# Patient Record
Sex: MALE | Race: BLACK OR AFRICAN AMERICAN | Employment: FULL TIME | URBAN - METROPOLITAN AREA
[De-identification: names, ages, dates, MRNs, and addresses within clinical notes are randomized per-mention and may not be internally consistent; named-entity substitution may affect disease eponyms.]

---

## 2024-03-12 ENCOUNTER — HOSPITAL ENCOUNTER (OUTPATIENT)
Age: 59
Discharge: HOME | End: 2024-03-12
Payer: COMMERCIAL

## 2024-03-12 LAB
CREAT UR-MCNC: 214.9 MG/DL (ref 39–259)
MICROALBUMIN 24H UR-MCNC: 9.6 MG/DL
MICROALBUMIN/CREAT UR-RTO: 44.7 MG/G CREAT (ref 0–30)

## 2024-03-12 PROCEDURE — 85025 COMPLETE CBC W/AUTO DIFF WBC: CPT

## 2024-03-12 PROCEDURE — 80061 LIPID PANEL: CPT

## 2024-03-12 PROCEDURE — 83721 ASSAY OF BLOOD LIPOPROTEIN: CPT

## 2024-03-12 PROCEDURE — 84439 ASSAY OF FREE THYROXINE: CPT

## 2024-03-12 PROCEDURE — 80053 COMPREHEN METABOLIC PANEL: CPT

## 2024-03-12 PROCEDURE — 82570 ASSAY OF URINE CREATININE: CPT

## 2024-03-12 PROCEDURE — 82043 UR ALBUMIN QUANTITATIVE: CPT

## 2024-03-12 PROCEDURE — 82306 VITAMIN D 25 HYDROXY: CPT

## 2024-03-12 PROCEDURE — 83036 HEMOGLOBIN GLYCOSYLATED A1C: CPT

## 2024-03-12 PROCEDURE — 84443 ASSAY THYROID STIM HORMONE: CPT

## 2024-03-15 ENCOUNTER — HOSPITAL ENCOUNTER (OUTPATIENT)
Age: 59
Discharge: HOME | End: 2024-03-15
Payer: COMMERCIAL

## 2024-03-15 LAB
25(OH)D3 SERPL-MCNC: 48.03 NG/ML
ALBUMIN SERPL-MCNC: 4.6 GM/DL (ref 3.4–5)
ALP BLD-CCNC: 91 IU/L (ref 40–128)
ALT SERPL-CCNC: 15 U/L (ref 10–40)
ANION GAP SERPL CALCULATED.3IONS-SCNC: 12 MMOL/L (ref 7–16)
AST SERPL-CCNC: 15 IU/L (ref 15–37)
BASOPHILS ABSOLUTE: 0.1 K/CU MM
BASOPHILS RELATIVE PERCENT: 1.1 % (ref 0–1)
BILIRUB SERPL-MCNC: 0.4 MG/DL (ref 0–1)
BUN SERPL-MCNC: 20 MG/DL (ref 6–23)
CALCIUM SERPL-MCNC: 9.7 MG/DL (ref 8.3–10.6)
CHLORIDE BLD-SCNC: 104 MMOL/L (ref 99–110)
CHOLESTEROL, FASTING: 138 MG/DL
CO2: 22 MMOL/L (ref 21–32)
CREAT SERPL-MCNC: 0.9 MG/DL (ref 0.9–1.3)
DIFFERENTIAL TYPE: ABNORMAL
EOSINOPHILS ABSOLUTE: 0.2 K/CU MM
EOSINOPHILS RELATIVE PERCENT: 3.4 % (ref 0–3)
ESTIMATED AVERAGE GLUCOSE: 154 MG/DL
GFR SERPL CREATININE-BSD FRML MDRD: >60 ML/MIN/1.73M2
GLUCOSE SERPL-MCNC: 164 MG/DL (ref 70–99)
HBA1C MFR BLD: 7 % (ref 4.2–6.3)
HCT VFR BLD CALC: 47.8 % (ref 42–52)
HDLC SERPL-MCNC: 56 MG/DL
HEMOGLOBIN: 15 GM/DL (ref 13.5–18)
IMMATURE NEUTROPHIL %: 0.2 % (ref 0–0.43)
LDLC SERPL CALC-MCNC: 71 MG/DL
LDLC SERPL-MCNC: 67 MG/DL
LYMPHOCYTES ABSOLUTE: 2.3 K/CU MM
LYMPHOCYTES RELATIVE PERCENT: 36.7 % (ref 24–44)
MCH RBC QN AUTO: 28.1 PG (ref 27–31)
MCHC RBC AUTO-ENTMCNC: 31.4 % (ref 32–36)
MCV RBC AUTO: 89.5 FL (ref 78–100)
MONOCYTES ABSOLUTE: 0.6 K/CU MM
MONOCYTES RELATIVE PERCENT: 9.4 % (ref 0–4)
NUCLEATED RBC %: 0 %
PDW BLD-RTO: 14.2 % (ref 11.7–14.9)
PLATELET # BLD: 201 K/CU MM (ref 140–440)
PMV BLD AUTO: 10.9 FL (ref 7.5–11.1)
POTASSIUM SERPL-SCNC: 4.4 MMOL/L (ref 3.5–5.1)
RBC # BLD: 5.34 M/CU MM (ref 4.6–6.2)
SEGMENTED NEUTROPHILS ABSOLUTE COUNT: 3 K/CU MM
SEGMENTED NEUTROPHILS RELATIVE PERCENT: 49.2 % (ref 36–66)
SODIUM BLD-SCNC: 138 MMOL/L (ref 135–145)
T4 FREE SERPL-MCNC: 1.48 NG/DL (ref 0.9–1.8)
TOTAL IMMATURE NEUTOROPHIL: 0.01 K/CU MM
TOTAL NUCLEATED RBC: 0 K/CU MM
TOTAL PROTEIN: 7.4 GM/DL (ref 6.4–8.2)
TRIGLYCERIDE, FASTING: 57 MG/DL
TSH SERPL DL<=0.005 MIU/L-ACNC: 0.83 UIU/ML (ref 0.27–4.2)
WBC # BLD: 6.2 K/CU MM (ref 4–10.5)

## 2024-03-15 PROCEDURE — 83036 HEMOGLOBIN GLYCOSYLATED A1C: CPT

## 2024-03-15 PROCEDURE — 84439 ASSAY OF FREE THYROXINE: CPT

## 2024-03-15 PROCEDURE — 84443 ASSAY THYROID STIM HORMONE: CPT

## 2024-03-15 PROCEDURE — 82306 VITAMIN D 25 HYDROXY: CPT

## 2024-03-15 PROCEDURE — 80053 COMPREHEN METABOLIC PANEL: CPT

## 2024-03-15 PROCEDURE — 83721 ASSAY OF BLOOD LIPOPROTEIN: CPT

## 2024-03-15 PROCEDURE — 82570 ASSAY OF URINE CREATININE: CPT

## 2024-03-15 PROCEDURE — 82043 UR ALBUMIN QUANTITATIVE: CPT

## 2024-03-15 PROCEDURE — 36415 COLL VENOUS BLD VENIPUNCTURE: CPT

## 2024-03-15 PROCEDURE — 85025 COMPLETE CBC W/AUTO DIFF WBC: CPT

## 2024-03-15 PROCEDURE — 80061 LIPID PANEL: CPT

## 2024-06-30 ENCOUNTER — HOSPITAL ENCOUNTER (EMERGENCY)
Age: 59
Discharge: HOME | End: 2024-06-30
Payer: COMMERCIAL

## 2024-06-30 ENCOUNTER — APPOINTMENT (OUTPATIENT)
Dept: GENERAL RADIOLOGY | Age: 59
End: 2024-06-30
Payer: COMMERCIAL

## 2024-06-30 VITALS
TEMPERATURE: 98.2 F | SYSTOLIC BLOOD PRESSURE: 135 MMHG | RESPIRATION RATE: 18 BRPM | DIASTOLIC BLOOD PRESSURE: 70 MMHG | OXYGEN SATURATION: 99 % | HEART RATE: 62 BPM | WEIGHT: 190 LBS

## 2024-06-30 DIAGNOSIS — S68.119A AMPUTATION OF TIP OF FINGER, INITIAL ENCOUNTER: Primary | ICD-10-CM

## 2024-06-30 PROCEDURE — 90715 TDAP VACCINE 7 YRS/> IM: CPT | Performed by: NURSE PRACTITIONER

## 2024-06-30 PROCEDURE — 99284 EMERGENCY DEPT VISIT MOD MDM: CPT

## 2024-06-30 PROCEDURE — 6370000000 HC RX 637 (ALT 250 FOR IP): Performed by: NURSE PRACTITIONER

## 2024-06-30 PROCEDURE — 90471 IMMUNIZATION ADMIN: CPT | Performed by: NURSE PRACTITIONER

## 2024-06-30 PROCEDURE — 6360000002 HC RX W HCPCS: Performed by: NURSE PRACTITIONER

## 2024-06-30 PROCEDURE — 73140 X-RAY EXAM OF FINGER(S): CPT

## 2024-06-30 RX ORDER — HYDROCODONE BITARTRATE AND ACETAMINOPHEN 5; 325 MG/1; MG/1
1 TABLET ORAL EVERY 6 HOURS PRN
Status: DISCONTINUED | OUTPATIENT
Start: 2024-06-30 | End: 2024-06-30 | Stop reason: HOSPADM

## 2024-06-30 RX ADMIN — TETANUS TOXOID, REDUCED DIPHTHERIA TOXOID AND ACELLULAR PERTUSSIS VACCINE, ADSORBED 0.5 ML: 5; 2.5; 8; 8; 2.5 SUSPENSION INTRAMUSCULAR at 13:55

## 2024-06-30 RX ADMIN — HYDROCODONE BITARTRATE AND ACETAMINOPHEN 1 TABLET: 5; 325 TABLET ORAL at 13:55

## 2024-06-30 ASSESSMENT — PAIN DESCRIPTION - DESCRIPTORS: DESCRIPTORS: THROBBING

## 2024-06-30 ASSESSMENT — PAIN SCALES - GENERAL
PAINLEVEL_OUTOF10: 9
PAINLEVEL_OUTOF10: 8
PAINLEVEL_OUTOF10: 8

## 2024-06-30 ASSESSMENT — PAIN DESCRIPTION - ORIENTATION: ORIENTATION: RIGHT

## 2024-06-30 ASSESSMENT — PAIN - FUNCTIONAL ASSESSMENT
PAIN_FUNCTIONAL_ASSESSMENT: 0-10
PAIN_FUNCTIONAL_ASSESSMENT: 0-10

## 2024-06-30 ASSESSMENT — PAIN DESCRIPTION - LOCATION: LOCATION: HAND

## 2024-06-30 ASSESSMENT — LIFESTYLE VARIABLES
HOW MANY STANDARD DRINKS CONTAINING ALCOHOL DO YOU HAVE ON A TYPICAL DAY: PATIENT DOES NOT DRINK
HOW OFTEN DO YOU HAVE A DRINK CONTAINING ALCOHOL: NEVER

## 2024-06-30 NOTE — DISCHARGE INSTR - COC
Continuity of Care Form    Patient Name: Jersey Huertas   :  1965  MRN:  [id number]    Admit date:  2024  Discharge date:  ***    Code Status Order: No Order   Advance Directives:     Admitting Physician:  No admitting provider for patient encounter.  PCP: Unknown, Provider, APRN - NP    Discharging Nurse: ***  Discharging Hospital Unit/Room#: IWR/IW  Discharging Unit Phone Number: ***    Emergency Contact:   Extended Emergency Contact Information  Primary Emergency Contact: anai huertas  Mobile Phone: [phone]  Relation: Parent  Preferred language: English   needed? No    Past Surgical History:  No past surgical history on file.    Immunization History:   Immunization History   Administered Date(s) Administered    COVID-19, PFIZER PURPLE top, DILUTE for use, (age 12 y+), 30mcg/0.3mL 2021    TDaP, ADACEL (age 10y-64y), BOOSTRIX (age 10y+), IM, 0.5mL 2024       Active Problems:  There is no problem list on file for this patient.      Isolation/Infection:   Isolation            No Isolation          Patient Infection Status       None to display            Nurse Assessment:  Last Vital Signs: /70   Pulse 62   Temp 98.2 °F (36.8 °C) (Oral)   Resp 18   Wt 86.2 kg (190 lb)   SpO2 99%     Last documented pain score (0-10 scale): Pain Level: 8  Last Weight:   Wt Readings from Last 1 Encounters:   24 86.2 kg (190 lb)     Mental Status:  {IP PT MENTAL STATUS:}    IV Access:  { STACEY IV ACCESS:935256431}    Nursing Mobility/ADLs:  Walking   {CHP DME ADLs:809744284}  Transfer  {CHP DME ADLs:161694449}  Bathing  {CHP DME ADLs:613691955}  Dressing  {CHP DME ADLs:963937654}  Toileting  {CHP DME ADLs:298450982}  Feeding  {CHP DME ADLs:918804089}  Med Admin  {CHP DME ADLs:748192085}  Med Delivery   { STACEY MED Delivery:746245536}    Wound Care Documentation and Therapy:        Elimination:  Continence:   Bowel: {YES / NO:}  Bladder: {YES / NO:}  Urinary Catheter:

## 2024-06-30 NOTE — ED PROVIDER NOTES
[facility] EMERGENCY DEPARTMENT  EMERGENCY DEPARTMENT ENCOUNTER      Pt Name: Jersey Huertas  MRN: [id number]  Birthdate 1965  Date of evaluation: 6/30/2024  Provider: SANDRA Alcaraz CNP  PCP: Unknown, Provider, APRN - NP  Note Started: 1:49 PM EDT 6/30/24    I am the Primary Clinician of Record.  PEDRO. I have evaluated this patient.      CHIEF COMPLAINT       Chief Complaint   Patient presents with    Laceration     Middle finger on right hand     HISTORY OF PRESENT ILLNESS: 1 or more Elements     History from : Patient    Limitations to history : None    Jersey Huertas is a 58 y.o. male who presents to the emergency department with amputation of the distal tip of the right long finger.  He states that this occurred from a lawnmower blade today.  Last tetanus unknown.    Nursing Notes were all reviewed and agreed with or any disagreements were addressed in the HPI.    REVIEW OF SYSTEMS :      Review of Systems   Skin:  Positive for wound (Right long finger distal tip amputation). Negative for color change, pallor and rash.     Positives and Pertinent negatives as per HPI.     SURGICAL HISTORY   No past surgical history on file.    CURRENTMEDICATIONS       There are no discharge medications for this patient.      ALLERGIES     Patient has no known allergies.    FAMILYHISTORY     No family history on file.     SOCIAL HISTORY          SCREENINGS        [place] Coma Scale  Eye Opening: Spontaneous  Best Verbal Response: Oriented  Best Motor Response: Obeys commands  [place] Coma Scale Score: 15                   CIWA Assessment  BP: 135/70  Pulse: 62           PHYSICAL EXAM  1 or more Elements     ED Triage Vitals [06/30/24 1244]   BP Temp Temp Source Pulse Respirations SpO2 Height Weight - Scale   135/70 98.2 °F (36.8 °C) Oral 62 18 99 % -- 86.2 kg (190 lb)       Physical Exam    Constitutional:  Well developed, Well nourished.  No distress  Cardiac:  Regular

## 2024-06-30 NOTE — DISCHARGE INSTRUCTIONS
Please keep the dressing on affected finger until you see the hand doctor.  Call to make an appointment with Dr. Bamberger or another hand surgeon in the group.  Keep the wound clean and dry.  Return to the emergency department for any fevers, signs of infection or pain out of proportion.

## 2024-09-13 ENCOUNTER — HOSPITAL ENCOUNTER (OUTPATIENT)
Age: 59
Discharge: HOME | End: 2024-09-13
Payer: COMMERCIAL

## 2024-09-13 LAB
25(OH)D3 SERPL-MCNC: 32.5 NG/ML (ref 30–150)
ALBUMIN SERPL-MCNC: 4.4 G/DL (ref 3.4–5)
ALBUMIN/GLOB SERPL: 1.8 {RATIO} (ref 1.1–2.2)
ALP SERPL-CCNC: 88 U/L (ref 40–129)
ALT SERPL-CCNC: 22 U/L (ref 10–40)
ANION GAP SERPL CALCULATED.3IONS-SCNC: 11 MMOL/L (ref 9–17)
AST SERPL-CCNC: 21 U/L (ref 15–37)
BASOPHILS # BLD: 0.04 K/UL
BASOPHILS NFR BLD: 1 % (ref 0–1)
BILIRUB SERPL-MCNC: 0.3 MG/DL (ref 0–1)
BUN SERPL-MCNC: 15 MG/DL (ref 7–20)
CALCIUM SERPL-MCNC: 9.1 MG/DL (ref 8.3–10.6)
CHLORIDE SERPL-SCNC: 105 MMOL/L (ref 99–110)
CHOLEST SERPL-MCNC: 149 MG/DL (ref 125–199)
CO2 SERPL-SCNC: 23 MMOL/L (ref 21–32)
CREAT SERPL-MCNC: 1 MG/DL (ref 0.9–1.3)
EOSINOPHIL # BLD: 0.29 K/UL
EOSINOPHILS RELATIVE PERCENT: 4 % (ref 0–3)
ERYTHROCYTE [DISTWIDTH] IN BLOOD BY AUTOMATED COUNT: 15.3 % (ref 11.7–14.9)
EST. AVERAGE GLUCOSE BLD GHB EST-MCNC: 167 MG/DL
GFR, ESTIMATED: 78 ML/MIN/1.73M2
GLUCOSE SERPL-MCNC: 145 MG/DL (ref 74–99)
HBA1C MFR BLD: 7.5 % (ref 4.2–6.3)
HCT VFR BLD AUTO: 42.7 % (ref 42–52)
HDLC SERPL-MCNC: 59 MG/DL
HGB BLD-MCNC: 13.7 G/DL (ref 13.5–18)
IMM GRANULOCYTES # BLD AUTO: 0.01 K/UL
IMM GRANULOCYTES NFR BLD: 0 %
LDLC SERPL CALC-MCNC: 75 MG/DL
LYMPHOCYTES NFR BLD: 3.01 K/UL
LYMPHOCYTES RELATIVE PERCENT: 43 % (ref 24–44)
MCH RBC QN AUTO: 28.5 PG (ref 27–31)
MCHC RBC AUTO-ENTMCNC: 32.1 G/DL (ref 32–36)
MCV RBC AUTO: 89 FL (ref 78–100)
MONOCYTES NFR BLD: 0.49 K/UL
MONOCYTES NFR BLD: 7 % (ref 0–4)
NEUTROPHILS NFR BLD: 45 % (ref 36–66)
NEUTS SEG NFR BLD: 3.1 K/UL
PLATELET # BLD AUTO: 208 K/UL (ref 140–440)
PMV BLD AUTO: 10.7 FL (ref 7.5–11.1)
POTASSIUM SERPL-SCNC: 4 MMOL/L (ref 3.5–5.1)
PROT SERPL-MCNC: 6.8 G/DL (ref 6.4–8.2)
RBC # BLD AUTO: 4.8 M/UL (ref 4.6–6.2)
SODIUM SERPL-SCNC: 139 MMOL/L (ref 136–145)
T4 FREE SERPL-MCNC: 1.3 NG/DL (ref 0.9–1.8)
TRIGL SERPL-MCNC: 78 MG/DL
TSH SERPL DL<=0.05 MIU/L-ACNC: 0.5 UIU/ML (ref 0.27–4.2)
WBC OTHER # BLD: 6.9 K/UL (ref 4–10.5)

## 2024-09-13 PROCEDURE — 83036 HEMOGLOBIN GLYCOSYLATED A1C: CPT

## 2024-09-13 PROCEDURE — 36415 COLL VENOUS BLD VENIPUNCTURE: CPT

## 2024-09-13 PROCEDURE — 80053 COMPREHEN METABOLIC PANEL: CPT

## 2024-09-13 PROCEDURE — 82306 VITAMIN D 25 HYDROXY: CPT

## 2024-09-13 PROCEDURE — 85025 COMPLETE CBC W/AUTO DIFF WBC: CPT

## 2024-09-13 PROCEDURE — 80061 LIPID PANEL: CPT

## 2024-09-13 PROCEDURE — 84439 ASSAY OF FREE THYROXINE: CPT

## 2024-09-13 PROCEDURE — 84443 ASSAY THYROID STIM HORMONE: CPT

## 2024-09-14 ENCOUNTER — HOSPITAL ENCOUNTER (OUTPATIENT)
Age: 59
Setting detail: SPECIMEN
Discharge: HOME | End: 2024-09-14
Payer: COMMERCIAL

## 2024-09-14 LAB
CREAT UR-MCNC: 142 MG/DL (ref 39–259)
MICROALBUMIN UR-MCNC: <1 MG/L
MICROALBUMIN/CREAT UR-RTO: NORMAL MCG/MG CREAT (ref 0–2)

## 2024-09-14 PROCEDURE — 82570 ASSAY OF URINE CREATININE: CPT

## 2024-09-14 PROCEDURE — 82043 UR ALBUMIN QUANTITATIVE: CPT
